# Patient Record
Sex: FEMALE | Race: WHITE | NOT HISPANIC OR LATINO | Employment: OTHER | URBAN - METROPOLITAN AREA
[De-identification: names, ages, dates, MRNs, and addresses within clinical notes are randomized per-mention and may not be internally consistent; named-entity substitution may affect disease eponyms.]

---

## 2020-02-18 ENCOUNTER — TELEPHONE (OUTPATIENT)
Dept: PODIATRY | Facility: CLINIC | Age: 80
End: 2020-02-18

## 2020-02-18 ENCOUNTER — OFFICE VISIT (OUTPATIENT)
Dept: PODIATRY | Facility: CLINIC | Age: 80
End: 2020-02-18
Payer: MEDICARE

## 2020-02-18 ENCOUNTER — HOSPITAL ENCOUNTER (OUTPATIENT)
Dept: RADIOLOGY | Facility: HOSPITAL | Age: 80
Discharge: HOME | End: 2020-02-18
Attending: PODIATRIST
Payer: MEDICARE

## 2020-02-18 VITALS
WEIGHT: 215 LBS | SYSTOLIC BLOOD PRESSURE: 140 MMHG | TEMPERATURE: 97 F | HEART RATE: 68 BPM | HEIGHT: 65 IN | BODY MASS INDEX: 35.82 KG/M2 | DIASTOLIC BLOOD PRESSURE: 69 MMHG

## 2020-02-18 DIAGNOSIS — M20.12 HALLUX VALGUS OF LEFT FOOT: ICD-10-CM

## 2020-02-18 DIAGNOSIS — M19.079 OSTEOARTHRITIS OF ANKLE AND FOOT, UNSPECIFIED LATERALITY: Primary | ICD-10-CM

## 2020-02-18 DIAGNOSIS — M19.079 OSTEOARTHRITIS OF ANKLE AND FOOT, UNSPECIFIED LATERALITY: ICD-10-CM

## 2020-02-18 PROCEDURE — 99999 PR PBB SHADOW E&M-NEW PATIENT-LVL III: CPT | Mod: PBBFAC,,, | Performed by: PODIATRIST

## 2020-02-18 PROCEDURE — 99999 PR PBB SHADOW E&M-NEW PATIENT-LVL III: ICD-10-PCS | Mod: PBBFAC,,, | Performed by: PODIATRIST

## 2020-02-18 PROCEDURE — 73630 X-RAY EXAM OF FOOT: CPT | Mod: 26,LT,, | Performed by: RADIOLOGY

## 2020-02-18 PROCEDURE — 99203 OFFICE O/P NEW LOW 30 MIN: CPT | Mod: PBBFAC,25,PN | Performed by: PODIATRIST

## 2020-02-18 PROCEDURE — 73630 X-RAY EXAM OF FOOT: CPT | Mod: TC,PN,LT

## 2020-02-18 PROCEDURE — 99203 OFFICE O/P NEW LOW 30 MIN: CPT | Mod: S$PBB,,, | Performed by: PODIATRIST

## 2020-02-18 PROCEDURE — 99203 PR OFFICE/OUTPT VISIT, NEW, LEVL III, 30-44 MIN: ICD-10-PCS | Mod: S$PBB,,, | Performed by: PODIATRIST

## 2020-02-18 PROCEDURE — 73630 XR FOOT COMPLETE 3 VIEW LEFT: ICD-10-PCS | Mod: 26,LT,, | Performed by: RADIOLOGY

## 2020-02-18 RX ORDER — BUDESONIDE 3 MG/1
CAPSULE, COATED PELLETS ORAL
COMMUNITY

## 2020-02-18 RX ORDER — DIAZEPAM 5 MG/1
TABLET ORAL
COMMUNITY

## 2020-02-18 RX ORDER — SIMVASTATIN 10 MG/1
TABLET, FILM COATED ORAL
COMMUNITY

## 2020-02-18 RX ORDER — LEVOTHYROXINE SODIUM 112 UG/1
TABLET ORAL
COMMUNITY

## 2020-02-18 RX ORDER — NYSTATIN 100000 [USP'U]/ML
4 SUSPENSION ORAL 4 TIMES DAILY
Qty: 160 ML | Refills: 1 | Status: SHIPPED | OUTPATIENT
Start: 2020-02-18 | End: 2020-02-28

## 2020-02-18 RX ORDER — LOPERAMIDE HYDROCHLORIDE 2 MG/1
CAPSULE ORAL
COMMUNITY
Start: 2020-02-06

## 2020-02-18 RX ORDER — DICLOFENAC SODIUM 10 MG/G
2 GEL TOPICAL 4 TIMES DAILY
Qty: 3 TUBE | Refills: 3 | Status: SHIPPED | OUTPATIENT
Start: 2020-02-18 | End: 2020-08-06

## 2020-02-18 RX ORDER — LISINOPRIL AND HYDROCHLOROTHIAZIDE 10; 12.5 MG/1; MG/1
TABLET ORAL
COMMUNITY
Start: 2020-01-04

## 2020-02-18 NOTE — TELEPHONE ENCOUNTER
----- Message from Micah David DPM sent at 2/18/2020 12:56 PM CST -----  Please call the patient regarding her abnormal result.Xrays reviewed no signs of infection but there is arthritis and I will review xrays at F/U.

## 2020-02-23 NOTE — PROGRESS NOTES
Subjective:       Patient ID: Concetta King is a 79 y.o. female.    Chief Complaint: Follow-up; Foot Pain; and Foot Problem   Patient presents with a complaint of pain and swelling around the big toe joint on her left foot.  Patient was last seen in my office approximately 4-1/2 years ago she states the pain and swelling in her left foot started a few weeks ago she has been placed on antibiotics and was advised this was cellulitis.  Patient states the antibiotics have caused oral thrush.    Past Medical History:   Diagnosis Date    Hypertension     Ulcerative colitis      Past Surgical History:   Procedure Laterality Date    CHOLECYSTECTOMY      COLON SURGERY      KNEE SURGERY       History reviewed. No pertinent family history.  Social History     Socioeconomic History    Marital status:      Spouse name: Not on file    Number of children: Not on file    Years of education: Not on file    Highest education level: Not on file   Occupational History    Not on file   Social Needs    Financial resource strain: Not on file    Food insecurity:     Worry: Not on file     Inability: Not on file    Transportation needs:     Medical: Not on file     Non-medical: Not on file   Tobacco Use    Smoking status: Never Smoker    Smokeless tobacco: Never Used   Substance and Sexual Activity    Alcohol use: Never     Frequency: Never    Drug use: Never    Sexual activity: Not Currently   Lifestyle    Physical activity:     Days per week: Not on file     Minutes per session: Not on file    Stress: Not on file   Relationships    Social connections:     Talks on phone: Not on file     Gets together: Not on file     Attends Yarsani service: Not on file     Active member of club or organization: Not on file     Attends meetings of clubs or organizations: Not on file     Relationship status: Not on file   Other Topics Concern    Not on file   Social History Narrative    Not on file       Current Outpatient  "Medications   Medication Sig Dispense Refill    bimatoprost (LUMIGAN) 0.01 % Drop Lumigan 0.01 % eye drops      budesonide (ENTOCORT EC) 3 mg capsule budesonide DR - ER 3 mg capsule,delayed,extended release   Take 3 capsules every day by oral route as directed for 90 days.      diazePAM (VALIUM) 5 MG tablet diazepam 5 mg tablet      levothyroxine (SYNTHROID) 112 MCG tablet levothyroxine 112 mcg tablet      lisinopril-hydrochlorothiazide (PRINZIDE,ZESTORETIC) 10-12.5 mg per tablet       simvastatin (ZOCOR) 10 MG tablet simvastatin 10 mg tablet      diclofenac sodium (VOLTAREN) 1 % Gel Apply 2 g topically 4 (four) times daily. 3 Tube 3    loperamide (IMODIUM) 2 mg capsule       nystatin (MYCOSTATIN) 100,000 unit/mL suspension Take 4 mLs (400,000 Units total) by mouth 4 (four) times daily. for 10 days 160 mL 1     No current facility-administered medications for this visit.      Review of patient's allergies indicates:   Allergen Reactions    Codeine Nausea Only       Review of Systems   Musculoskeletal: Positive for arthralgias, gait problem and joint swelling.   All other systems reviewed and are negative.      Objective:      Vitals:    02/18/20 0855   BP: (!) 140/69   Pulse: 68   Temp: 97 °F (36.1 °C)   Weight: 97.5 kg (215 lb)   Height: 5' 5" (1.651 m)     Physical Exam   Constitutional: She appears well-developed and well-nourished.   Cardiovascular:   Pulses:       Dorsalis pedis pulses are 1+ on the right side, and 1+ on the left side.        Posterior tibial pulses are 1+ on the right side, and 1+ on the left side.   Pulmonary/Chest: Effort normal.   Musculoskeletal: She exhibits tenderness and deformity.        Right foot: There is decreased range of motion.        Left foot: There is decreased range of motion and deformity.        Feet:    Feet:   Right Foot:   Protective Sensation: 4 sites tested. 4 sites sensed.   Left Foot:   Protective Sensation: 4 sites tested. 4 sites sensed.   Skin " Integrity: Positive for erythema and warmth.   Neurological: She is alert.   Skin: Skin is warm. Capillary refill takes more than 3 seconds. There is erythema.   Psychiatric: She has a normal mood and affect. Her behavior is normal. Judgment and thought content normal.   Nursing note and vitals reviewed.           Assessment:       1. Osteoarthritis of ankle and foot, unspecified laterality    2. Hallux valgus of left foot        Plan:       Patient presents with a complaint of pain and swelling around the big toe joint on her left foot.  Patient was last seen in my office approximately 4-1/2 years ago she states the pain and swelling in her left foot started a few weeks ago she has been placed on antibiotics and was advised this was cellulitis.  Patient states the antibiotics have caused oral thrush.  On evaluation patient advised that cellulitis is generalized term for redness the patient has obvious signs of inflammation surrounding the 1st MPJ of the left foot secondary to degenerative changes.  Patient was advised there is no skin breaks no active signs of bacterial infection this appears to be all related to degenerative arthritis I have ordered x-rays of the patient's left foot subsequent x-rays revealed degenerative arthritic changes with mild bunion deformity left notable osteopenia was also present.  Patient was contacted advised as to the results of the plain film x-rays I have recommended the use of Voltaren gel to settle down the inflammation surrounding this joint I advised the patient to start using it initially 3 to 4 times a day and then as needed I did not recommend oral anti-inflammatory at this time patient was in understanding and agreement with this I have advised her if she is not seeing signs of improvement over the next several weeks to contact me for further evaluation and treatment as needed.  Total face-to-face time including discussion evaluation treatment review of x-rays and discussion  of treatment plan equaled 30 min.This note was created using [employer] voice recognition software that occasionally misinterpreted phrases or words.

## 2022-07-22 ENCOUNTER — LAB VISIT (OUTPATIENT)
Dept: LAB | Facility: HOSPITAL | Age: 82
End: 2022-07-22
Attending: NEUROLOGICAL SURGERY
Payer: MEDICARE

## 2022-07-22 DIAGNOSIS — G91.8 LOW PRESSURE HYDROCEPHALUS: Primary | ICD-10-CM

## 2022-07-22 LAB
ALBUMIN SERPL BCP-MCNC: 3.5 G/DL (ref 3.5–5.2)
ALP SERPL-CCNC: 101 U/L (ref 55–135)
ALT SERPL W/O P-5'-P-CCNC: 22 U/L (ref 10–44)
ANION GAP SERPL CALC-SCNC: 11 MMOL/L (ref 8–16)
APTT BLDCRRT: 25.8 SEC (ref 21–32)
AST SERPL-CCNC: 30 U/L (ref 10–40)
BASOPHILS # BLD AUTO: 0.05 K/UL (ref 0–0.2)
BASOPHILS NFR BLD: 0.8 % (ref 0–1.9)
BILIRUB SERPL-MCNC: 0.5 MG/DL (ref 0.1–1)
BILIRUB UR QL STRIP: NEGATIVE
BUN SERPL-MCNC: 6 MG/DL (ref 8–23)
CALCIUM SERPL-MCNC: 9.4 MG/DL (ref 8.7–10.5)
CHLORIDE SERPL-SCNC: 103 MMOL/L (ref 95–110)
CLARITY UR: CLEAR
CO2 SERPL-SCNC: 23 MMOL/L (ref 23–29)
COLOR UR: YELLOW
CREAT SERPL-MCNC: 0.7 MG/DL (ref 0.5–1.4)
DIFFERENTIAL METHOD: ABNORMAL
EOSINOPHIL # BLD AUTO: 0.2 K/UL (ref 0–0.5)
EOSINOPHIL NFR BLD: 3.8 % (ref 0–8)
ERYTHROCYTE [DISTWIDTH] IN BLOOD BY AUTOMATED COUNT: 13.9 % (ref 11.5–14.5)
EST. GFR  (AFRICAN AMERICAN): >60 ML/MIN/1.73 M^2
EST. GFR  (NON AFRICAN AMERICAN): >60 ML/MIN/1.73 M^2
GLUCOSE SERPL-MCNC: 89 MG/DL (ref 70–110)
GLUCOSE UR QL STRIP: NEGATIVE
HCT VFR BLD AUTO: 37.3 % (ref 37–48.5)
HGB BLD-MCNC: 12.2 G/DL (ref 12–16)
HGB UR QL STRIP: ABNORMAL
IMM GRANULOCYTES # BLD AUTO: 0.01 K/UL (ref 0–0.04)
IMM GRANULOCYTES NFR BLD AUTO: 0.2 % (ref 0–0.5)
INR PPP: 1.1 (ref 0.8–1.2)
KETONES UR QL STRIP: NEGATIVE
LEUKOCYTE ESTERASE UR QL STRIP: NEGATIVE
LYMPHOCYTES # BLD AUTO: 2.6 K/UL (ref 1–4.8)
LYMPHOCYTES NFR BLD: 43 % (ref 18–48)
MCH RBC QN AUTO: 31 PG (ref 27–31)
MCHC RBC AUTO-ENTMCNC: 32.7 G/DL (ref 32–36)
MCV RBC AUTO: 95 FL (ref 82–98)
MONOCYTES # BLD AUTO: 0.7 K/UL (ref 0.3–1)
MONOCYTES NFR BLD: 11.8 % (ref 4–15)
NEUTROPHILS # BLD AUTO: 2.4 K/UL (ref 1.8–7.7)
NEUTROPHILS NFR BLD: 40.4 % (ref 38–73)
NITRITE UR QL STRIP: NEGATIVE
NRBC BLD-RTO: 0 /100 WBC
PH UR STRIP: 5 [PH] (ref 5–8)
PLATELET # BLD AUTO: 184 K/UL (ref 150–450)
PMV BLD AUTO: 11 FL (ref 9.2–12.9)
POTASSIUM SERPL-SCNC: 3.4 MMOL/L (ref 3.5–5.1)
PROT SERPL-MCNC: 7.7 G/DL (ref 6–8.4)
PROT UR QL STRIP: NEGATIVE
PROTHROMBIN TIME: 11.9 SEC (ref 9–12.5)
RBC # BLD AUTO: 3.94 M/UL (ref 4–5.4)
SODIUM SERPL-SCNC: 137 MMOL/L (ref 136–145)
SP GR UR STRIP: 1.02 (ref 1–1.03)
URN SPEC COLLECT METH UR: ABNORMAL
UROBILINOGEN UR STRIP-ACNC: NEGATIVE EU/DL
WBC # BLD AUTO: 6.03 K/UL (ref 3.9–12.7)

## 2022-07-22 PROCEDURE — 36415 COLL VENOUS BLD VENIPUNCTURE: CPT | Performed by: NEUROLOGICAL SURGERY

## 2022-07-22 PROCEDURE — 81003 URINALYSIS AUTO W/O SCOPE: CPT | Performed by: NEUROLOGICAL SURGERY

## 2022-07-22 PROCEDURE — 85610 PROTHROMBIN TIME: CPT | Performed by: NEUROLOGICAL SURGERY

## 2022-07-22 PROCEDURE — 80053 COMPREHEN METABOLIC PANEL: CPT | Performed by: NEUROLOGICAL SURGERY

## 2022-07-22 PROCEDURE — 85025 COMPLETE CBC W/AUTO DIFF WBC: CPT | Performed by: NEUROLOGICAL SURGERY

## 2022-07-22 PROCEDURE — 85576 BLOOD PLATELET AGGREGATION: CPT | Performed by: NEUROLOGICAL SURGERY

## 2022-07-22 PROCEDURE — 85730 THROMBOPLASTIN TIME PARTIAL: CPT | Performed by: NEUROLOGICAL SURGERY

## 2022-07-25 LAB — PLATELET FUNCTION ASSAY - EPINEPHRINE: 130 SECS (ref 76–199)

## 2022-08-29 ENCOUNTER — LAB VISIT (OUTPATIENT)
Dept: LAB | Facility: HOSPITAL | Age: 82
End: 2022-08-29
Attending: NEUROLOGICAL SURGERY
Payer: MEDICARE

## 2022-08-29 DIAGNOSIS — G91.8 LOW PRESSURE HYDROCEPHALUS: Primary | ICD-10-CM

## 2022-08-29 LAB
ALBUMIN SERPL BCP-MCNC: 3.5 G/DL (ref 3.5–5.2)
ALP SERPL-CCNC: 88 U/L (ref 55–135)
ALT SERPL W/O P-5'-P-CCNC: 20 U/L (ref 10–44)
ANION GAP SERPL CALC-SCNC: 12 MMOL/L (ref 8–16)
APTT BLDCRRT: 27.9 SEC (ref 21–32)
AST SERPL-CCNC: 34 U/L (ref 10–40)
BASOPHILS # BLD AUTO: 0.02 K/UL (ref 0–0.2)
BASOPHILS NFR BLD: 0.3 % (ref 0–1.9)
BILIRUB SERPL-MCNC: 0.5 MG/DL (ref 0.1–1)
BUN SERPL-MCNC: 14 MG/DL (ref 8–23)
CALCIUM SERPL-MCNC: 8.9 MG/DL (ref 8.7–10.5)
CHLORIDE SERPL-SCNC: 105 MMOL/L (ref 95–110)
CO2 SERPL-SCNC: 21 MMOL/L (ref 23–29)
CREAT SERPL-MCNC: 0.8 MG/DL (ref 0.5–1.4)
DIFFERENTIAL METHOD: ABNORMAL
EOSINOPHIL # BLD AUTO: 0.1 K/UL (ref 0–0.5)
EOSINOPHIL NFR BLD: 0.8 % (ref 0–8)
ERYTHROCYTE [DISTWIDTH] IN BLOOD BY AUTOMATED COUNT: 13.5 % (ref 11.5–14.5)
EST. GFR  (NO RACE VARIABLE): >60 ML/MIN/1.73 M^2
GLUCOSE SERPL-MCNC: 96 MG/DL (ref 70–110)
HCT VFR BLD AUTO: 34.2 % (ref 37–48.5)
HGB BLD-MCNC: 11.3 G/DL (ref 12–16)
IMM GRANULOCYTES # BLD AUTO: 0.01 K/UL (ref 0–0.04)
IMM GRANULOCYTES NFR BLD AUTO: 0.2 % (ref 0–0.5)
INR PPP: 1.2 (ref 0.8–1.2)
LYMPHOCYTES # BLD AUTO: 2.4 K/UL (ref 1–4.8)
LYMPHOCYTES NFR BLD: 37.6 % (ref 18–48)
MCH RBC QN AUTO: 31.2 PG (ref 27–31)
MCHC RBC AUTO-ENTMCNC: 33 G/DL (ref 32–36)
MCV RBC AUTO: 95 FL (ref 82–98)
MONOCYTES # BLD AUTO: 0.5 K/UL (ref 0.3–1)
MONOCYTES NFR BLD: 7.9 % (ref 4–15)
NEUTROPHILS # BLD AUTO: 3.4 K/UL (ref 1.8–7.7)
NEUTROPHILS NFR BLD: 53.2 % (ref 38–73)
NRBC BLD-RTO: 0 /100 WBC
PLATELET # BLD AUTO: 196 K/UL (ref 150–450)
PMV BLD AUTO: 10.7 FL (ref 9.2–12.9)
POTASSIUM SERPL-SCNC: 3.8 MMOL/L (ref 3.5–5.1)
PROT SERPL-MCNC: 7.8 G/DL (ref 6–8.4)
PROTHROMBIN TIME: 12 SEC (ref 9–12.5)
RBC # BLD AUTO: 3.62 M/UL (ref 4–5.4)
SODIUM SERPL-SCNC: 138 MMOL/L (ref 136–145)
WBC # BLD AUTO: 6.46 K/UL (ref 3.9–12.7)

## 2022-08-29 PROCEDURE — 85610 PROTHROMBIN TIME: CPT | Performed by: NEUROLOGICAL SURGERY

## 2022-08-29 PROCEDURE — 87081 CULTURE SCREEN ONLY: CPT | Performed by: NEUROLOGICAL SURGERY

## 2022-08-29 PROCEDURE — 36415 COLL VENOUS BLD VENIPUNCTURE: CPT | Performed by: NEUROLOGICAL SURGERY

## 2022-08-29 PROCEDURE — 80053 COMPREHEN METABOLIC PANEL: CPT | Performed by: NEUROLOGICAL SURGERY

## 2022-08-29 PROCEDURE — 85025 COMPLETE CBC W/AUTO DIFF WBC: CPT | Performed by: NEUROLOGICAL SURGERY

## 2022-08-29 PROCEDURE — 85730 THROMBOPLASTIN TIME PARTIAL: CPT | Performed by: NEUROLOGICAL SURGERY

## 2022-08-31 ENCOUNTER — LAB VISIT (OUTPATIENT)
Dept: LAB | Facility: HOSPITAL | Age: 82
End: 2022-08-31
Attending: NEUROLOGICAL SURGERY
Payer: MEDICARE

## 2022-08-31 DIAGNOSIS — G91.8 LOW PRESSURE HYDROCEPHALUS: Primary | ICD-10-CM

## 2022-08-31 LAB
MRSA SPEC QL CULT: NORMAL
PLATELET FUNCTION ASSAY - EPINEPHRINE: 152 SECS (ref 76–199)

## 2022-08-31 PROCEDURE — 85576 BLOOD PLATELET AGGREGATION: CPT | Performed by: NEUROLOGICAL SURGERY

## 2022-08-31 PROCEDURE — 36415 COLL VENOUS BLD VENIPUNCTURE: CPT | Performed by: NEUROLOGICAL SURGERY

## 2022-09-14 ENCOUNTER — OFFICE VISIT (OUTPATIENT)
Dept: OBSTETRICS AND GYNECOLOGY | Facility: CLINIC | Age: 82
End: 2022-09-14
Payer: MEDICARE

## 2022-09-14 VITALS
HEIGHT: 65 IN | DIASTOLIC BLOOD PRESSURE: 63 MMHG | BODY MASS INDEX: 31.99 KG/M2 | SYSTOLIC BLOOD PRESSURE: 133 MMHG | WEIGHT: 192 LBS

## 2022-09-14 DIAGNOSIS — N95.0 POSTMENOPAUSAL BLEEDING: ICD-10-CM

## 2022-09-14 PROBLEM — Z00.00 WELLNESS EXAMINATION: Status: ACTIVE | Noted: 2022-09-14

## 2022-09-14 PROBLEM — Z86.73 HISTORY OF CVA (CEREBROVASCULAR ACCIDENT): Status: ACTIVE | Noted: 2022-09-14

## 2022-09-14 PROCEDURE — 99213 OFFICE O/P EST LOW 20 MIN: CPT | Mod: S$GLB,,, | Performed by: OBSTETRICS & GYNECOLOGY

## 2022-09-14 PROCEDURE — 99213 PR OFFICE/OUTPT VISIT, EST, LEVL III, 20-29 MIN: ICD-10-PCS | Mod: S$GLB,,, | Performed by: OBSTETRICS & GYNECOLOGY

## 2022-09-14 RX ORDER — BIMATOPROST 0.1 MG/ML
1 SOLUTION/ DROPS OPHTHALMIC
COMMUNITY
Start: 2022-03-27

## 2022-09-14 RX ORDER — CLOPIDOGREL BISULFATE 75 MG/1
TABLET ORAL
COMMUNITY
Start: 2022-08-17

## 2022-09-14 RX ORDER — ATORVASTATIN CALCIUM 40 MG/1
TABLET, FILM COATED ORAL
COMMUNITY
Start: 2022-08-17

## 2022-09-14 RX ORDER — BUSPIRONE HYDROCHLORIDE 5 MG/1
1 TABLET ORAL 3 TIMES DAILY
COMMUNITY
Start: 2022-03-09

## 2022-09-14 RX ORDER — LEVOCETIRIZINE DIHYDROCHLORIDE 5 MG/1
1 TABLET, FILM COATED ORAL DAILY
COMMUNITY
Start: 2022-01-06

## 2022-09-14 RX ORDER — BUSPIRONE HYDROCHLORIDE 5 MG/1
TABLET ORAL
COMMUNITY
Start: 2022-06-01

## 2022-09-14 RX ORDER — ONDANSETRON 4 MG/1
4 TABLET, FILM COATED ORAL
COMMUNITY
Start: 2022-05-17

## 2022-09-14 RX ORDER — LISINOPRIL 10 MG/1
TABLET ORAL
COMMUNITY
Start: 2022-08-03

## 2022-09-14 RX ORDER — CLOPIDOGREL BISULFATE 75 MG/1
75 TABLET ORAL
COMMUNITY
Start: 2022-01-06

## 2022-09-14 RX ORDER — DIAZEPAM 5 MG/1
1 TABLET ORAL DAILY PRN
COMMUNITY
Start: 2022-04-13

## 2022-09-14 RX ORDER — CHLORHEXIDINE GLUCONATE 40 MG/ML
SOLUTION TOPICAL DAILY PRN
COMMUNITY
Start: 2022-09-08 | End: 2022-09-22

## 2022-09-14 RX ORDER — NITROFURANTOIN 25; 75 MG/1; MG/1
CAPSULE ORAL
COMMUNITY
Start: 2022-07-31

## 2022-09-14 RX ORDER — CALCIUM CARBONATE 600 MG
600 TABLET ORAL
COMMUNITY
Start: 2022-01-06

## 2022-09-14 RX ORDER — LEVOCETIRIZINE DIHYDROCHLORIDE 5 MG/1
TABLET, FILM COATED ORAL
COMMUNITY
Start: 2022-08-17

## 2022-09-14 RX ORDER — IRON,CARBONYL/ASCORBIC ACID 100-250 MG
TABLET ORAL
COMMUNITY
Start: 2022-06-21

## 2022-09-14 RX ORDER — NITROFURANTOIN 25; 75 MG/1; MG/1
100 CAPSULE ORAL 2 TIMES DAILY
COMMUNITY
Start: 2022-08-16

## 2022-09-14 RX ORDER — NAPROXEN SODIUM 220 MG/1
81 TABLET, FILM COATED ORAL
COMMUNITY
Start: 2022-09-09

## 2022-09-14 RX ORDER — POTASSIUM CHLORIDE 1500 MG/1
TABLET, EXTENDED RELEASE ORAL
COMMUNITY
Start: 2022-01-06 | End: 2022-10-03

## 2022-09-14 RX ORDER — LISINOPRIL 10 MG/1
1 TABLET ORAL DAILY
COMMUNITY
Start: 2022-01-06

## 2022-09-14 RX ORDER — OXYCODONE AND ACETAMINOPHEN 10; 325 MG/1; MG/1
1 TABLET ORAL EVERY 4 HOURS PRN
COMMUNITY
Start: 2022-09-08 | End: 2022-09-15

## 2022-09-14 RX ORDER — ONDANSETRON 4 MG/1
TABLET, FILM COATED ORAL
COMMUNITY
Start: 2022-05-17

## 2022-09-14 RX ORDER — MIDODRINE HYDROCHLORIDE 5 MG/1
TABLET ORAL
COMMUNITY
Start: 2022-08-13

## 2022-09-14 RX ORDER — MIDODRINE HYDROCHLORIDE 5 MG/1
1 TABLET ORAL DAILY PRN
COMMUNITY
Start: 2022-01-27

## 2022-09-14 RX ORDER — ESCITALOPRAM OXALATE 20 MG/1
TABLET ORAL
COMMUNITY
Start: 2022-07-08

## 2022-09-14 RX ORDER — IRON,CARBONYL/ASCORBIC ACID 100-250 MG
TABLET ORAL
COMMUNITY
Start: 2022-04-07

## 2022-09-14 RX ORDER — FLUTICASONE PROPIONATE 50 MCG
SPRAY, SUSPENSION (ML) NASAL
COMMUNITY
Start: 2022-08-17

## 2022-09-14 RX ORDER — FLUTICASONE PROPIONATE 50 MCG
1 SPRAY, SUSPENSION (ML) NASAL
COMMUNITY
Start: 2022-03-25

## 2022-09-14 RX ORDER — ATORVASTATIN CALCIUM 40 MG/1
1 TABLET, FILM COATED ORAL DAILY
COMMUNITY
Start: 2022-01-06

## 2022-09-14 RX ORDER — LEVOTHYROXINE SODIUM 112 UG/1
1 TABLET ORAL DAILY
COMMUNITY
Start: 2022-01-06

## 2022-09-14 NOTE — PROGRESS NOTES
Concetta King is a 82 y.o.  who presents today for GYN problem visit.     C/C:   Chief Complaint   Patient presents with    Vaginal Bleeding     Patient is following up from er on vaginal bleeding.       HPI: Has GYN related concerns including recent postmenopausal bleeding for the last 2 days and the this may be associated with recent treatment with blood thinner for her intracranial shunt placement a few days ago.  She was seen in the emergency room yesterday for this abnormal bleeding and she was also found to have some rectal bleeding and her blood thinner testing shows a prolonged PT and PTT so she must of had some anticoagulant therapy during her shunt surgery.  So I think this is associated with her being over anticoagulated.  Her CT scan done in the emergency room yesterday did not show any abnormalities in the pelvis but it was an abdominal CT scan so they did mention much about pelvic structures.  Nevertheless, we need to do an ultrasound to look at the endometrial stripe so we will order that today at [facility] so they can compare it to the CT scan.  Otherwise the patient appears stable and doing well and her blood count was okay yesterday in the emergency room so she has not lost significant amount of blood..     MENSTRUAL HISTORY  No LMP recorded (lmp unknown). Patient is postmenopausal..      PAP HISTORY: last pap last Pap smear was approximately 6 years ago and was normal so she would not be due for another Pap smear but with this episode of abnormal bleeding we may get a Pap smear if we end up doing a endometrial biopsy.,  denies any history of abnormal pap smear        Review of patient's allergies indicates:   Allergen Reactions    Cefuroxime axetil     Ciprofloxacin Itching     Burning itching.    Codeine Nausea Only    Levofloxacin Itching     Ankle pain.      Morphine Itching    Naproxen Nausea Only    Sulfa (sulfonamide antibiotics) Itching     Past Medical History:   Diagnosis  "Date    Hypertension     Stroke     Thyroid disease     Ulcerative colitis      Past Surgical History:   Procedure Laterality Date    CHOLECYSTECTOMY      COLON SURGERY      CSF SHUNT      KNEE SURGERY       Past Surgical History:   Procedure Laterality Date    CHOLECYSTECTOMY      COLON SURGERY      CSF SHUNT      KNEE SURGERY       OB History    Para Term  AB Living   2 2 2         SAB IAB Ectopic Multiple Live Births                  # Outcome Date GA Lbr Stephen/2nd Weight Sex Delivery Anes PTL Lv   2 Term            1 Term              OB History          2    Para   2    Term   2            AB        Living             SAB        IAB        Ectopic        Multiple        Live Births                   History reviewed. No pertinent family history.  Social History     Substance and Sexual Activity   Sexual Activity Not Currently       Primary Doctor No          ROS:  GENERAL: Denies weight gain or weight loss. Feeling well overall.   SKIN: Denies rash or lesions.   HEAD: Denies head injury or headache.   NODES: Denies enlarged lymph nodes.   CHEST: Denies chest pain or shortness of breath.    ABDOMEN: No abdominal pain, constipation, diarrhea, nausea, vomiting or rectal bleeding.   URINARY: No frequency, dysuria, hematuria, or burning on urination.  REPRODUCTIVE: See HPI.   BREASTS: denies pain, lumps, or nipple discharge.   HEMATOLOGIC: No easy bruisability or excessive bleeding.   MUSCULOSKELETAL: Denies joint pain or swelling.   NEUROLOGIC: Denies syncope or weakness.   PSYCHIATRIC: Denies depression, anxiety or mood swings.      OBJECTIVE:  /63   Ht 5' 5" (1.651 m)   Wt 87.1 kg (192 lb)   LMP  (LMP Unknown)   BMI 31.95 kg/m²   PHYSICAL EXAM:  APPEARANCE: Well nourished, well developed, in no acute distress.  AFFECT: WNL, alert and oriented x 3  SKIN: No acne or hirsutism  CHEST: Good respiratory effect  ABDOMEN: Soft.  No tenderness or masses.  No hepatosplenomegaly.  No " hernias.  BREASTS:   PELVIC:     EXTREMITIES: No edema.      A:  Abnormal postmenopausal bleeding apparently coming from the uterus but possible that she also is having rectal bleeding that may be confused with vaginal bleeding.  The emergency room physician did confirm that this was coming from the vaginal area also.  This bleeding is associated with recent anti coagulation therapy.  82 y.o. female  for GYN problem visit  Body mass index is 31.95 kg/m².  Patient Active Problem List   Diagnosis    Osteoarthritis of ankle and foot    Hallux valgus of left foot    Postmenopausal bleeding    History of CVA (cerebrovascular accident)    Wellness examination         P:  Needs ultrasound to look at endometrial stripe and uterus to see why she would be having abnormal uterine bleeding at this time and rule out possible endometrial polyp.  Will call patient the results of this ultrasound.  Postmenopausal bleeding  -     US Pelvis Comp with Transvag NON-OB (xpd; Future; Expected date: 2022    ----Continue annual exams, return then or sooner prn      No follow-ups on file.

## 2022-12-19 PROBLEM — Z00.00 WELLNESS EXAMINATION: Status: RESOLVED | Noted: 2022-09-14 | Resolved: 2022-12-19

## 2023-01-29 ENCOUNTER — OFFICE VISIT (OUTPATIENT)
Dept: URGENT CARE | Facility: CLINIC | Age: 83
End: 2023-01-29
Payer: MEDICARE

## 2023-01-29 VITALS
BODY MASS INDEX: 31.99 KG/M2 | HEART RATE: 83 BPM | OXYGEN SATURATION: 98 % | SYSTOLIC BLOOD PRESSURE: 122 MMHG | WEIGHT: 192 LBS | DIASTOLIC BLOOD PRESSURE: 72 MMHG | TEMPERATURE: 99 F | HEIGHT: 65 IN

## 2023-01-29 DIAGNOSIS — R53.83 FATIGUE, UNSPECIFIED TYPE: ICD-10-CM

## 2023-01-29 DIAGNOSIS — B96.89 BACTERIAL URI: Primary | ICD-10-CM

## 2023-01-29 DIAGNOSIS — R09.81 NASAL CONGESTION: ICD-10-CM

## 2023-01-29 DIAGNOSIS — R05.9 COUGH, UNSPECIFIED TYPE: ICD-10-CM

## 2023-01-29 DIAGNOSIS — J06.9 BACTERIAL URI: Primary | ICD-10-CM

## 2023-01-29 LAB
CTP QC/QA: YES
CTP QC/QA: YES
POC MOLECULAR INFLUENZA A AGN: NEGATIVE
POC MOLECULAR INFLUENZA B AGN: NEGATIVE
SARS-COV-2 AG RESP QL IA.RAPID: NEGATIVE

## 2023-01-29 PROCEDURE — 99203 OFFICE O/P NEW LOW 30 MIN: CPT | Mod: S$GLB,,, | Performed by: NURSE PRACTITIONER

## 2023-01-29 PROCEDURE — 99203 PR OFFICE/OUTPT VISIT, NEW, LEVL III, 30-44 MIN: ICD-10-PCS | Mod: S$GLB,,, | Performed by: NURSE PRACTITIONER

## 2023-01-29 PROCEDURE — 87502 POCT INFLUENZA A/B MOLECULAR: ICD-10-PCS | Mod: QW,S$GLB,, | Performed by: NURSE PRACTITIONER

## 2023-01-29 PROCEDURE — 87811 SARS CORONAVIRUS 2 ANTIGEN POCT, MANUAL READ: ICD-10-PCS | Mod: QW,CR,S$GLB, | Performed by: NURSE PRACTITIONER

## 2023-01-29 PROCEDURE — 87811 SARS-COV-2 COVID19 W/OPTIC: CPT | Mod: QW,CR,S$GLB, | Performed by: NURSE PRACTITIONER

## 2023-01-29 PROCEDURE — 87502 INFLUENZA DNA AMP PROBE: CPT | Mod: QW,S$GLB,, | Performed by: NURSE PRACTITIONER

## 2023-01-29 RX ORDER — BENZONATATE 200 MG/1
200 CAPSULE ORAL 3 TIMES DAILY PRN
Qty: 21 CAPSULE | Refills: 0 | Status: SHIPPED | OUTPATIENT
Start: 2023-01-29 | End: 2023-02-08

## 2023-01-29 RX ORDER — AZITHROMYCIN 250 MG/1
TABLET, FILM COATED ORAL
Qty: 6 TABLET | Refills: 0 | Status: SHIPPED | OUTPATIENT
Start: 2023-01-29

## 2023-01-29 NOTE — PROGRESS NOTES
"Subjective:       Patient ID: Concetta King is a 82 y.o. female.    Vitals:  height is 5' 5" (1.651 m) and weight is 87.1 kg (192 lb). Her oral temperature is 98.8 °F (37.1 °C). Her blood pressure is 122/72 and her pulse is 83. Her oxygen saturation is 98%.     Chief Complaint: Cough    This is a 82 y.o. female who presents today with a chief complaint of cough   Patient presents with:  Patient reports that she had been experiencing chest congestion with persistent cough nasal congestion with nasal discharge headache fatigue body aches and overall not feeling well over the past 5 days.  Patient reports an acute worsening of symptoms over the past 48 hours with increased and chest congestion with productive cough producing yellow sputum with no relief with OTC medication.  Patient reports that her  is currently being treated for pneumonia at home.        Cough  This is a new problem. The current episode started in the past 7 days. The problem has been gradually worsening. The cough is Productive of sputum. Associated symptoms include myalgias and postnasal drip. Pertinent negatives include no shortness of breath or wheezing. Nothing aggravates the symptoms. She has tried nothing for the symptoms. Her past medical history is significant for environmental allergies.     Constitution: Negative.   HENT:  Positive for postnasal drip and sinus pressure.    Neck: neck negative.   Cardiovascular: Negative.    Eyes: Negative.    Respiratory:  Positive for cough. Negative for shortness of breath and wheezing.    Gastrointestinal: Negative.    Genitourinary: Negative.    Musculoskeletal:  Positive for muscle ache.   Skin:  Negative for color change.   Allergic/Immunologic: Positive for environmental allergies.   Neurological:  Negative for disorientation and altered mental status.   Psychiatric/Behavioral: Negative.  Negative for altered mental status, disorientation and confusion.          Objective:      Physical " Exam   Constitutional: She is oriented to person, place, and time. She appears well-developed. She is cooperative.  Non-toxic appearance. She does not appear ill. No distress.   HENT:   Head: Normocephalic and atraumatic.   Ears:   Right Ear: Hearing, external ear and ear canal normal. Tympanic membrane is not erythematous and not bulging. A middle ear effusion is present.   Left Ear: Hearing, external ear and ear canal normal. Tympanic membrane is not erythematous and not bulging. A middle ear effusion is present.   Nose: No mucosal edema, rhinorrhea or nasal deformity. No epistaxis. Right sinus exhibits maxillary sinus tenderness. Right sinus exhibits no frontal sinus tenderness. Left sinus exhibits maxillary sinus tenderness. Left sinus exhibits no frontal sinus tenderness.   Mouth/Throat: Uvula is midline and mucous membranes are normal. No trismus in the jaw. Normal dentition. No uvula swelling. Posterior oropharyngeal erythema and cobblestoning present. No oropharyngeal exudate or posterior oropharyngeal edema.   Eyes: Conjunctivae and lids are normal. No scleral icterus.   Neck: Trachea normal and phonation normal. Neck supple. No edema present. No erythema present. No neck rigidity present.   Cardiovascular: Normal rate, regular rhythm, normal heart sounds and normal pulses.   Pulmonary/Chest: Effort normal. No respiratory distress. She has no decreased breath sounds. She has no wheezes. She has rhonchi (mild BUL).   Abdominal: Normal appearance.   Musculoskeletal: Normal range of motion.         General: No deformity. Normal range of motion.   Neurological: She is alert and oriented to person, place, and time. She exhibits normal muscle tone. Coordination normal.   Skin: Skin is warm, dry, intact, not diaphoretic and not pale.   Psychiatric: Her speech is normal and behavior is normal. Judgment and thought content normal.   Nursing note and vitals reviewed.      Past medical history and current medications  reviewed.     Results for orders placed or performed in visit on 01/29/23   POCT Influenza A/B MOLECULAR   Result Value Ref Range    POC Molecular Influenza A Ag Negative Negative, Not Reported    POC Molecular Influenza B Ag Negative Negative, Not Reported     Acceptable Yes    SARS Coronavirus 2 Antigen, POCT Manual Read   Result Value Ref Range    SARS Coronavirus 2 Antigen Negative Negative     Acceptable Yes           Assessment:           1. Bacterial URI    2. Cough, unspecified type    3. Nasal congestion    4. Fatigue, unspecified type              Plan:         Bacterial URI  -     azithromycin (Z-BETSEY) 250 MG tablet; Take 2 tablets by mouth on day 1; Then Take 1 tablet by mouth on days 2-5  Dispense: 6 tablet; Refill: 0    Cough, unspecified type  -     POCT Influenza A/B MOLECULAR  -     SARS Coronavirus 2 Antigen, POCT Manual Read  -     benzonatate (TESSALON) 200 MG capsule; Take 1 capsule (200 mg total) by mouth 3 (three) times daily as needed for Cough.  Dispense: 21 capsule; Refill: 0    Nasal congestion    Fatigue, unspecified type             Patient Instructions   Please return here or go to the Emergency Department for any concerns or worsening of condition.  Please drink plenty of fluids.  Please get plenty of rest.  If you were prescribed antibiotics, please take them to completion.  If you were given wait & see antibiotics, please wait 5-7 days before taking them, and only take them if your symptoms have worsened or not improved.  If you do begin taking the antibiotics, please take them to completion.  IIf you do not have Hypertension or any history of palpitations, it is ok to take over the counter Sudafed or Mucinex D or Allegra-D or Claritin-D or Zyrtec-D.  If you do take one of the above, it is ok to combine that with plain over the counter Mucinex or Allegra or Claritin or Zyrtec.  If for example you are taking Zyrtec -D, you can combine that with Mucinex,  but not Mucinex-D.  If you are taking Mucinex-D, you can combine that with plain Allegra or Claritin or Zyrtec.   If you do have Hypertension or palpitations, it is safe to take Coricidin HBP for relief of sinus symptoms.  If not allergic, please take over the counter Tylenol (Acetaminophen) and/or Motrin (Ibuprofen) as directed for control of pain and/or fever.  Please follow up with your primary care doctor or specialist as needed.    If you  smoke, please stop smoking.

## 2023-01-29 NOTE — PATIENT INSTRUCTIONS
Please return here or go to the Emergency Department for any concerns or worsening of condition.  Please drink plenty of fluids.  Please get plenty of rest.  If you were prescribed antibiotics, please take them to completion.  If you were given wait & see antibiotics, please wait 5-7 days before taking them, and only take them if your symptoms have worsened or not improved.  If you do begin taking the antibiotics, please take them to completion.  IIf you do not have Hypertension or any history of palpitations, it is ok to take over the counter Sudafed or Mucinex D or Allegra-D or Claritin-D or Zyrtec-D.  If you do take one of the above, it is ok to combine that with plain over the counter Mucinex or Allegra or Claritin or Zyrtec.  If for example you are taking Zyrtec -D, you can combine that with Mucinex, but not Mucinex-D.  If you are taking Mucinex-D, you can combine that with plain Allegra or Claritin or Zyrtec.   If you do have Hypertension or palpitations, it is safe to take Coricidin HBP for relief of sinus symptoms.  If not allergic, please take over the counter Tylenol (Acetaminophen) and/or Motrin (Ibuprofen) as directed for control of pain and/or fever.  Please follow up with your primary care doctor or specialist as needed.    If you  smoke, please stop smoking.

## 2023-02-01 ENCOUNTER — TELEPHONE (OUTPATIENT)
Dept: URGENT CARE | Facility: CLINIC | Age: 83
End: 2023-02-01
Payer: MEDICARE

## 2023-02-01 NOTE — TELEPHONE ENCOUNTER
I spoke patient to follow-up patient was seen on 4 days ago and diagnosed with bacterial URI and treated with a Z-Josué and Tessalon Perles.  Patient reports major improvement in symptoms and reports that she is feeling much better.

## 2024-07-16 ENCOUNTER — OFFICE VISIT (OUTPATIENT)
Dept: URGENT CARE | Facility: CLINIC | Age: 84
End: 2024-07-16
Payer: MEDICARE

## 2024-07-16 VITALS
RESPIRATION RATE: 18 BRPM | HEIGHT: 65 IN | TEMPERATURE: 98 F | SYSTOLIC BLOOD PRESSURE: 138 MMHG | HEART RATE: 97 BPM | OXYGEN SATURATION: 98 % | BODY MASS INDEX: 31.99 KG/M2 | WEIGHT: 192 LBS | DIASTOLIC BLOOD PRESSURE: 82 MMHG

## 2024-07-16 DIAGNOSIS — S93.401A SPRAIN OF RIGHT ANKLE, UNSPECIFIED LIGAMENT, INITIAL ENCOUNTER: Primary | ICD-10-CM
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
## 2024-07-16 PROCEDURE — 99213 OFFICE O/P EST LOW 20 MIN: CPT | Mod: S$GLB,,,

## 2024-07-16 NOTE — PATIENT INSTRUCTIONS
"Treatment for a sprained ankle is easy to remember if you think of the word "PRICE":    ?Protect - To avoid making your injury worse, you can wrap it with an elastic bandage (figure 2). Depending on how bad your sprain is, you might also get a brace or splint.    ?Rest - To rest the ankle, you can use crutches and stay off your feet. Avoid activities that cause pain.    ?Ice - Apply a cold gel pack, bag of ice, or bag of frozen vegetables on your ankle every 1 to 2 hours, for 15 minutes each time. Put a thin towel between the ice (or other cold object) and your skin. Use the ice (or other cold object) for at least 6 hours after your injury. Some people find it helpful to ice longer, even up to 2 days after their injury.    ?Compression - Compression basically means pressure. You want to have your ankle under slight pressure by having it wrapped in an elastic "compression" bandage. This helps reduce swelling and supports the ankle. Your doctor or nurse will show you how to wrap your ankle. It's important that you do not use too much pressure and cut off the blood flow to your foot.    ?Elevation - "Elevation" means you should keep your foot raised up above the level of your heart. To do this, you can put your foot on some pillows or blankets while you are lying down, or on a table or chair while you are sitting.    You can also take medicines to relieve pain, such as acetaminophen (sample brand name: Tylenol), ibuprofen (sample brand names: Advil, Motrin), or naproxen (sample brand name: Aleve).  "

## 2024-07-16 NOTE — PROGRESS NOTES
"Subjective:      Patient ID: Concetta King is a 83 y.o. female.    Vitals:  height is 5' 5" (1.651 m) and weight is 87.1 kg (192 lb). Her temperature is 98.3 °F (36.8 °C). Her blood pressure is 138/82 and her pulse is 97. Her respiration is 18 and oxygen saturation is 98%.     Chief Complaint: Fall    83 year old female presents today with R foot and ankle pain. States she fell at home on 07/04/2024. States she went to the ED to get her head checked out but did not get her foot checked out that day. Edema, bruise, pain. Pain scale 07/10. Normal ROM but grimaces with movement. Treatments at home includes nothing.     Foot Pain  This is a new problem. Episode onset: 07/04/2024. Associated symptoms include arthralgias and joint swelling. The symptoms are aggravated by walking. She has tried nothing for the symptoms.       Constitution: Negative.   HENT: Negative.     Neck: neck negative.   Cardiovascular: Negative.    Eyes: Negative.    Respiratory: Negative.     Gastrointestinal: Negative.    Endocrine: negative.   Genitourinary: Negative.    Musculoskeletal:  Positive for trauma, joint pain and joint swelling.   Skin: Negative.    Allergic/Immunologic: Negative.    Neurological: Negative.    Hematologic/Lymphatic: Negative.    Psychiatric/Behavioral: Negative.        Objective:     Physical Exam   Constitutional: She is oriented to person, place, and time.   HENT:   Head: Normocephalic and atraumatic.   Nose: Nose normal.   Eyes: Conjunctivae are normal. Pupils are equal, round, and reactive to light. Extraocular movement intact   Neck: Neck supple.   Cardiovascular: Normal rate and normal pulses.   Pulmonary/Chest: Effort normal.   Abdominal: Normal appearance.   Musculoskeletal:         General: Swelling, tenderness and signs of injury present.      Right ankle: She exhibits swelling. She exhibits normal pulse. Tenderness. Lateral malleolus tenderness found.      Left ankle: Normal.   Neurological: no focal " deficit. She is alert, oriented to person, place, and time and at baseline.   Skin: Skin is warm.   Psychiatric: Her behavior is normal. Mood, judgment and thought content normal.   Nursing note and vitals reviewed.      Assessment:     1. Sprain of right ankle, unspecified ligament, initial encounter    2. Fall, initial encounter      EXAMINATION:  jXR ANKLE COMPLETE 3 VIEW RIGHT     CLINICAL HISTORY:  Unspecified fall, initial encounter     TECHNIQUE:  AP, lateral, and oblique images of the right ankle were performed.     COMPARISON:  None     FINDINGS:  Mild soft tissue swelling overlying the lateral malleolus.  No underlying bony fracture.  Tibiotalar articulation intact with mild degenerative osteoarthrosis.  Small dorsal and plantar calcaneal spurs.  Mild bone demineralization.     Impression:     Soft tissue swelling without acute fracture.  Plan:       Sprain of right ankle, unspecified ligament, initial encounter  -     ANKLE AIR SPLINT FOR HOME USE    Fall, initial encounter  -     XR ANKLE COMPLETE 3 VIEW RIGHT; Future; Expected date: 07/16/2024                     Male

## 2025-02-17 ENCOUNTER — OFFICE VISIT (OUTPATIENT)
Dept: URGENT CARE | Facility: CLINIC | Age: 85
End: 2025-02-17
Payer: MEDICARE

## 2025-02-17 VITALS
TEMPERATURE: 98 F | RESPIRATION RATE: 18 BRPM | DIASTOLIC BLOOD PRESSURE: 58 MMHG | HEIGHT: 65 IN | BODY MASS INDEX: 35.32 KG/M2 | SYSTOLIC BLOOD PRESSURE: 117 MMHG | OXYGEN SATURATION: 96 % | HEART RATE: 60 BPM | WEIGHT: 212 LBS

## 2025-02-17 DIAGNOSIS — R05.9 COUGH, UNSPECIFIED TYPE: ICD-10-CM

## 2025-02-17 DIAGNOSIS — J06.9 UPPER RESPIRATORY TRACT INFECTION, UNSPECIFIED TYPE: Primary | ICD-10-CM

## 2025-02-17 RX ORDER — POTASSIUM CHLORIDE 1500 MG/1
20 TABLET, EXTENDED RELEASE ORAL 2 TIMES DAILY
COMMUNITY
Start: 2025-01-06

## 2025-02-17 RX ORDER — ESCITALOPRAM OXALATE 10 MG/1
10 TABLET ORAL
COMMUNITY

## 2025-02-17 RX ORDER — BENZONATATE 200 MG/1
200 CAPSULE ORAL 3 TIMES DAILY PRN
Qty: 21 CAPSULE | Refills: 0 | Status: SHIPPED | OUTPATIENT
Start: 2025-02-17 | End: 2025-02-27

## 2025-02-17 RX ORDER — CHOLESTYRAMINE 4 G/4.8G
3.2 POWDER, FOR SUSPENSION ORAL 2 TIMES DAILY
COMMUNITY
Start: 2025-01-14

## 2025-02-17 RX ORDER — DIPHENOXYLATE HCL/ATROPINE 2.5-.025MG
TABLET ORAL
COMMUNITY
Start: 2024-03-22

## 2025-02-17 RX ORDER — DOXYCYCLINE 100 MG/1
100 CAPSULE ORAL 2 TIMES DAILY
Qty: 14 CAPSULE | Refills: 0 | Status: SHIPPED | OUTPATIENT
Start: 2025-02-17 | End: 2025-02-24

## 2025-02-17 NOTE — PROGRESS NOTES
Subjective:      Patient ID: Concetta King is a 84 y.o. female.    Chief Complaint: Cough    This is a 84 y.o. female who presents today with a chief complaint of persistent cough on and off that started two weeks ago with no improvement with OTC medication.  Patient has taken mucinex dm and her last breathing treatment was 4 days ago with no relief. She also reports exposure to a close household contact currently being treated for pneumonia.  Patient denies any shortness of breath.    Patient presents with:  Cough        Cough  This is a recurrent problem. The current episode started 1 to 4 weeks ago. The problem has been unchanged. The cough is Productive of sputum. Pertinent negatives include no shortness of breath. Treatments tried: breathing treatment and mucinex dm. The treatment provided no relief.       Constitution: Negative.   HENT:  Positive for congestion.    Respiratory:  Positive for cough. Negative for shortness of breath.      Objective:     Physical Exam  Constitutional:       General: She is not in acute distress.     Appearance: Normal appearance. She is not ill-appearing, toxic-appearing or diaphoretic.   HENT:      Head:      Comments: No complaints  Eyes:      General: Vision grossly intact. No visual field deficit.     Extraocular Movements: Extraocular movements intact.   Cardiovascular:      Rate and Rhythm: Normal rate.      Comments: No complaints   Pulmonary:      Effort: Pulmonary effort is normal.      Breath sounds: No decreased air movement. Rhonchi present. No decreased breath sounds, wheezing or rales.   Chest:      Comments: No complaints   Abdominal:      General: There is no distension.   Musculoskeletal:      Cervical back: Full passive range of motion without pain and normal range of motion.      Comments: No complaints. Pt ambulating with walker for assistance which is chronic.    Neurological:      Mental Status: She is alert.          XR CHEST PA AND LATERAL  Narrative:  EXAMINATION:  XR CHEST PA AND LATERAL    CLINICAL HISTORY:  Cough, unspecified    TECHNIQUE:  PA and lateral views of the chest were performed.    COMPARISON:  None    FINDINGS:  The lungs are clear.  No focal consolidation.  Heart size is normal.  Mediastinal contours unremarkable.    Bony thorax intact. 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   shunt catheter projects over the anterior chest.  Impression: No acute chest disease.    Electronically signed by: Antelmo Cobos  Date:    02/17/2025  Time:    12:14       Assessment:        1. Upper respiratory tract infection, unspecified type    2. Cough, unspecified type        Plan:       1. Upper respiratory tract infection, unspecified type  doxycycline (VIBRAMYCIN) 100 MG Cap      2. Cough, unspecified type  XR CHEST PA AND LATERAL    benzonatate (TESSALON) 200 MG capsule         Medications Ordered This Encounter   Medications    benzonatate (TESSALON) 200 MG capsule     Sig: Take 1 capsule (200 mg total) by mouth 3 (three) times daily as needed for Cough.     Dispense:  21 capsule     Refill:  0    doxycycline (VIBRAMYCIN) 100 MG Cap     Sig: Take 1 capsule (100 mg total) by mouth 2 (two) times daily. for 7 days     Dispense:  14 capsule     Refill:  0            Patient Instructions   Please return here or go to the Emergency Department for any concerns or worsening of condition.  Please drink plenty of fluids.  Please get plenty of rest.  If you were prescribed antibiotics, please take them to completion.  If you do have Hypertension or palpitations, it is safe to take Coricidin HBP for relief of sinus symptoms.  If not allergic, please take over the counter Tylenol (Acetaminophen) and/or Motrin (Ibuprofen) as directed for control of pain and/or fever.  Please follow up with your primary care doctor or specialist as needed.    If you  smoke, please stop smoking.        Sloan Osorio, SAKSHIP-C         MDM: Given patient's persistent symptoms and known exposure to  pneumonia.  I will prescribe patient doxycycline which she reports works well for her in the past.  Patient will only start this medication if no improvement in symptoms over the next 3-5 days.

## 2025-04-23 ENCOUNTER — TELEPHONE (OUTPATIENT)
Dept: PODIATRY | Facility: CLINIC | Age: 85
End: 2025-04-23
Payer: MEDICARE

## 2025-04-23 NOTE — TELEPHONE ENCOUNTER
----- Message from Micah David DPM sent at 4/22/2025  5:19 PM CDT -----  Patient is scheduled to be seen for a new patient visit for fungus toenails we need to make her aware we will gladly see her to discuss and to treat the fungus nails but we do not trimmed toenails.

## 2025-04-24 ENCOUNTER — OFFICE VISIT (OUTPATIENT)
Dept: PODIATRY | Facility: CLINIC | Age: 85
End: 2025-04-24
Payer: MEDICARE

## 2025-04-24 VITALS
HEIGHT: 65 IN | BODY MASS INDEX: 35.33 KG/M2 | DIASTOLIC BLOOD PRESSURE: 67 MMHG | WEIGHT: 212.06 LBS | HEART RATE: 62 BPM | SYSTOLIC BLOOD PRESSURE: 130 MMHG

## 2025-04-24 DIAGNOSIS — M19.079 OSTEOARTHRITIS OF ANKLE AND FOOT, UNSPECIFIED LATERALITY: ICD-10-CM

## 2025-04-24 DIAGNOSIS — I73.9 PERIPHERAL VASCULAR DISEASE: ICD-10-CM

## 2025-04-24 DIAGNOSIS — M20.12 HALLUX VALGUS OF LEFT FOOT: ICD-10-CM

## 2025-04-24 DIAGNOSIS — L60.0 INGROWN NAIL: Primary | ICD-10-CM

## 2025-04-24 PROCEDURE — 99203 OFFICE O/P NEW LOW 30 MIN: CPT | Mod: S$PBB,,, | Performed by: PODIATRIST

## 2025-04-24 PROCEDURE — 99999 PR PBB SHADOW E&M-EST. PATIENT-LVL V: CPT | Mod: PBBFAC,,, | Performed by: PODIATRIST

## 2025-04-24 PROCEDURE — 99215 OFFICE O/P EST HI 40 MIN: CPT | Mod: PBBFAC,PN | Performed by: PODIATRIST

## 2025-04-24 RX ORDER — ROSUVASTATIN CALCIUM 10 MG/1
10 TABLET, FILM COATED ORAL
COMMUNITY
Start: 2025-04-15

## 2025-04-27 PROBLEM — I73.9 PERIPHERAL VASCULAR DISEASE: Status: ACTIVE | Noted: 2025-04-27

## 2025-04-27 PROBLEM — L60.0 INGROWN NAIL: Status: ACTIVE | Noted: 2025-04-27

## 2025-04-27 NOTE — PROGRESS NOTES
"Subjective:       Patient ID: Concetta King is a 84 y.o. female.    Chief Complaint: Nail Problem  Patient presents today with family concerned over ingrowing toenails on both feet patient does have poor circulation and has had some issues with ingrowing nails in the past.  Patient's left great toe has been causing her discomfort as the nail has become very loose and detached from the nail bed.    Past Medical History:   Diagnosis Date    Hypertension     Stroke     Thyroid disease     Ulcerative colitis      Past Surgical History:   Procedure Laterality Date    CHOLECYSTECTOMY      COLON SURGERY      CSF SHUNT      KNEE SURGERY       No family history on file.  Social History     Socioeconomic History    Marital status:    Tobacco Use    Smoking status: Never    Smokeless tobacco: Never   Substance and Sexual Activity    Alcohol use: Never    Drug use: Never    Sexual activity: Not Currently       Current Medications[1]  Review of patient's allergies indicates:   Allergen Reactions    Cefuroxime axetil     Ciprofloxacin Itching     Burning itching.    Codeine Nausea Only    Levofloxacin Itching     Ankle pain.      Morphine Itching    Naproxen Nausea Only    Sulfa (sulfonamide antibiotics) Itching       Review of Systems   Musculoskeletal:  Positive for arthralgias.   All other systems reviewed and are negative.      Objective:      Vitals:    04/24/25 1504   BP: 130/67   Pulse: 62   Weight: 96.2 kg (212 lb 1.3 oz)   Height: 5' 5" (1.651 m)     Physical Exam  Vitals and nursing note reviewed. Exam conducted with a chaperone present.   Constitutional:       Appearance: Normal appearance.   Cardiovascular:      Pulses:           Dorsalis pedis pulses are 0 on the right side and 0 on the left side.        Posterior tibial pulses are 0 on the right side and 0 on the left side.   Pulmonary:      Effort: Pulmonary effort is normal.   Musculoskeletal:         General: Tenderness present.   Feet:      Right foot: "      Protective Sensation: 2 sites tested.  2 sites sensed.      Toenail Condition: Right toenails are abnormally thick, long and ingrown. Fungal disease present.     Left foot:      Protective Sensation: 2 sites tested.  2 sites sensed.      Skin integrity: Erythema present.      Toenail Condition: Left toenails are abnormally thick, long and ingrown. Fungal disease present.  Skin:     Capillary Refill: Capillary refill takes more than 3 seconds.      Findings: Erythema present.   Neurological:      General: No focal deficit present.      Mental Status: She is alert.   Psychiatric:         Mood and Affect: Mood normal.         Behavior: Behavior normal.                            Assessment:       1. Ingrown nail    2. Peripheral vascular disease    3. Osteoarthritis of ankle and foot, unspecified laterality    4. Hallux valgus of left foot        Plan:       Patient presents today with family concerned over ingrowing toenails on both feet patient does have poor circulation and has had some issues with ingrowing nails in the past.  Patient's left great toe has been causing her discomfort as the nail has become very loose and detached from the nail bed.  A comprehensive new patient evaluation was performed patient does have ingrowing toenail that is most significantly noted on the left hallux the nail plate has become loose it is growing into the medial lateral borders and required extensive debridement to remove the ingrown toenail.  Patient's family was advised the fungal infection in the toenails is certainly a contributing factor to the ingrowing toenails patient has varying degrees of fungal infection bilateral all digits there were several other digits that had ingrowing toenail that required debridement patient is at high risk for complication especially with her peripheral vascular disease and nonpalpable pulses.  I did recommend the application of Vicks vapor rub this needs to be applied twice a day every  day and will help to control the fungal infection in the nails it does take typically 4-6 week to see positive signs of results and I have advised the patient's family to continue to apply this as recommended.  Patient's family advised any problems questions concerns areas of redness swelling or drainage they are to contact us immediately otherwise recommended follow-up will be as needed.This note was created using [employer] voice recognition software that occasionally misinterpreted phrases or words.        [1]   Current Outpatient Medications   Medication Sig Dispense Refill    aspirin 81 MG Chew Take 81 mg by mouth.      atorvastatin (LIPITOR) 40 MG tablet Take 1 tablet by mouth once daily.      atorvastatin (LIPITOR) 40 MG tablet       azithromycin (Z-BETSEY) 250 MG tablet Take 2 tablets by mouth on day 1; Then Take 1 tablet by mouth on days 2-5 6 tablet 0    bimatoprost (LUMIGAN) 0.01 % Drop Lumigan 0.01 % eye drops      bimatoprost (LUMIGAN) 0.01 % Drop Apply 1 drop to eye.      budesonide (ENTOCORT EC) 3 mg capsule budesonide DR - ER 3 mg capsule,delayed,extended release   Take 3 capsules every day by oral route as directed for 90 days.      busPIRone (BUSPAR) 5 MG Tab Take 1 tablet by mouth 3 (three) times daily.      busPIRone (BUSPAR) 5 MG Tab       calcium carbonate (OS-ALBERT) 600 mg calcium (1,500 mg) Tab 600 mg.      cholestyramine-aspartame (QUESTRAN LIGHT) 4 gram PwPk Take 3.2 grams of anhydrous cholestyramine by mouth 2 (two) times daily.      clopidogreL (PLAVIX) 75 mg tablet Take 75 mg by mouth.      clopidogreL (PLAVIX) 75 mg tablet       CRESTOR 10 mg tablet 10 mg.      diazePAM (VALIUM) 5 MG tablet diazepam 5 mg tablet      diazePAM (VALIUM) 5 MG tablet Take 1 tablet by mouth daily as needed.      diclofenac sodium (VOLTAREN) 1 % Gel APPLY 2 GRAMS TO AFFECTED AREA 4 TIMES A  g 3    EScitalopram oxalate (LEXAPRO) 10 MG tablet Take 10 mg by mouth.      EScitalopram oxalate (LEXAPRO) 20 MG tablet        fluticasone propionate (FLONASE) 50 mcg/actuation nasal spray 1 spray by Nasal route.      fluticasone propionate (FLONASE) 50 mcg/actuation nasal spray       ICAR-C 100-250 mg Tab       iron-vitamin C 100-250 mg, ICAR-C, 100-250 mg Tab   See Instructions, TAKE 1 TABLET EVERY DAY, # 90 tab, 0 Refill(s), Pharmacy: [facility] ([facility]), 165, cm, 06/23/22 13:28:00 CDT, Height/Length Measured, 83.6, kg, 06/23/22 13:28:00 CDT, Weight Dosing      levocetirizine (XYZAL) 5 MG tablet Take 1 tablet by mouth once daily.      levocetirizine (XYZAL) 5 MG tablet       levothyroxine (SYNTHROID) 112 MCG tablet levothyroxine 112 mcg tablet      levothyroxine (SYNTHROID) 112 MCG tablet Take 1 tablet by mouth once daily.      lisinopriL 10 MG tablet Take 1 tablet by mouth once daily.      lisinopriL 10 MG tablet       lisinopril-hydrochlorothiazide (PRINZIDE,ZESTORETIC) 10-12.5 mg per tablet       LOMOTIL 2.5-0.025 mg per tablet 1 tab, Oral, q6h, PRN for loose stool, Do not exceed 3 times a day, # 50 tab, 0 Refill(s), Pharmacy: [facility]/pharmacy #[id number], 165, cm, 08/09/24 10:29:00 CDT, Height/Length Measured, 91.5, kg, 07/29/24 15:21:00 CDT, Weight Dosing      loperamide (IMODIUM) 2 mg capsule       midodrine (PROAMATINE) 5 MG Tab Take 1 tablet by mouth daily as needed.      midodrine (PROAMATINE) 5 MG Tab       nitrofurantoin, macrocrystal-monohydrate, (MACROBID) 100 MG capsule Take 100 mg by mouth 2 (two) times daily.      nitrofurantoin, macrocrystal-monohydrate, (MACROBID) 100 MG capsule   See Instructions, TAKE 1 CAPSULE BY MOUTH TWICE A DAY FOR 7 DAYS, # 14 cap, 0 Refill(s), Pharmacy: [facility] STORE [id number], 165, cm, 06/23/22 13:28:00 CDT, Height/Length Measured, 83.6, kg, 06/23/22 13:28:00 CDT, Weight Dosing      ondansetron (ZOFRAN) 4 MG tablet 4 mg.      ondansetron (ZOFRAN) 4 MG tablet TAKE 1 TABLET BY MOUTH EVERY 8 HOURS AS NEEDED FOR NAUSEA AND VOMITING      potassium chloride  (K-TAB) 20 mEq Take 20 mEq by mouth 2 (two) times daily.      simvastatin (ZOCOR) 10 MG tablet simvastatin 10 mg tablet       No current facility-administered medications for this visit.